# Patient Record
Sex: FEMALE | Race: WHITE
[De-identification: names, ages, dates, MRNs, and addresses within clinical notes are randomized per-mention and may not be internally consistent; named-entity substitution may affect disease eponyms.]

---
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
## 2023-08-11 ENCOUNTER — HOSPITAL ENCOUNTER (EMERGENCY)
Dept: HOSPITAL 74 - JER | Age: 23
Discharge: HOME | End: 2023-08-11
Payer: COMMERCIAL

## 2023-08-11 VITALS — BODY MASS INDEX: 21.4 KG/M2

## 2023-08-11 VITALS — HEART RATE: 81 BPM | SYSTOLIC BLOOD PRESSURE: 122 MMHG | DIASTOLIC BLOOD PRESSURE: 76 MMHG

## 2023-08-11 VITALS — RESPIRATION RATE: 18 BRPM

## 2023-08-11 DIAGNOSIS — R11.2: Primary | ICD-10-CM

## 2023-08-11 DIAGNOSIS — R10.84: ICD-10-CM

## 2023-08-11 DIAGNOSIS — F12.188: ICD-10-CM

## 2023-08-11 DIAGNOSIS — K29.60: ICD-10-CM

## 2023-08-11 DIAGNOSIS — R10.13: ICD-10-CM

## 2023-08-11 DIAGNOSIS — R55: ICD-10-CM

## 2023-08-11 LAB
ALBUMIN SERPL-MCNC: 5.1 G/DL (ref 3.4–5)
ALP SERPL-CCNC: 60 U/L (ref 45–117)
ALT SERPL-CCNC: 20 U/L (ref 13–61)
ANION GAP SERPL CALC-SCNC: 8 MMOL/L (ref 8–16)
APTT BLD: 30.1 SECONDS (ref 25.2–36.5)
AST SERPL-CCNC: 14 U/L (ref 15–37)
BASOPHILS # BLD: 0.6 % (ref 0–2)
BILIRUB SERPL-MCNC: 0.9 MG/DL (ref 0.2–1)
BUN SERPL-MCNC: 6 MG/DL (ref 7–18)
CALCIUM SERPL-MCNC: 9.3 MG/DL (ref 8.5–10.1)
CHLORIDE SERPL-SCNC: 106 MMOL/L (ref 98–107)
CO2 SERPL-SCNC: 27 MMOL/L (ref 21–32)
CREAT SERPL-MCNC: 0.7 MG/DL (ref 0.55–1.3)
DEPRECATED RDW RBC AUTO: 12.7 % (ref 11.6–15.6)
EOSINOPHIL # BLD: 1.2 % (ref 0–4.5)
GLUCOSE SERPL-MCNC: 90 MG/DL (ref 74–106)
HCT VFR BLD CALC: 40.2 % (ref 32.4–45.2)
HGB BLD-MCNC: 13.4 GM/DL (ref 10.7–15.3)
INR BLD: 1.13 (ref 0.83–1.09)
LIPASE SERPL-CCNC: 81 U/L (ref 73–393)
LYMPHOCYTES # BLD: 18.7 % (ref 8–40)
MAGNESIUM SERPL-MCNC: 2.3 MG/DL (ref 1.8–2.4)
MCH RBC QN AUTO: 31.3 PG (ref 25.7–33.7)
MCHC RBC AUTO-ENTMCNC: 33.4 G/DL (ref 32–36)
MCV RBC: 93.8 FL (ref 80–96)
MONOCYTES # BLD AUTO: 6 % (ref 3.8–10.2)
NEUTROPHILS # BLD: 73.5 % (ref 42.8–82.8)
PLATELET # BLD AUTO: 252 10^3/UL (ref 134–434)
PMV BLD: 9.8 FL (ref 7.5–11.1)
POTASSIUM SERPLBLD-SCNC: 3.4 MMOL/L (ref 3.5–5.1)
PROT SERPL-MCNC: 8.7 G/DL (ref 6.4–8.2)
PT PNL PPP: 13.1 SEC (ref 9.7–13)
RBC # BLD AUTO: 4.28 M/MM3 (ref 3.6–5.2)
SODIUM SERPL-SCNC: 140 MMOL/L (ref 136–145)
WBC # BLD AUTO: 8.5 K/MM3 (ref 4–10)

## 2023-08-11 PROCEDURE — 3E033GC INTRODUCTION OF OTHER THERAPEUTIC SUBSTANCE INTO PERIPHERAL VEIN, PERCUTANEOUS APPROACH: ICD-10-PCS

## 2023-08-11 PROCEDURE — 3E0333Z INTRODUCTION OF ANTI-INFLAMMATORY INTO PERIPHERAL VEIN, PERCUTANEOUS APPROACH: ICD-10-PCS

## 2024-02-23 PROBLEM — Z00.00 ENCOUNTER FOR PREVENTIVE HEALTH EXAMINATION: Status: ACTIVE | Noted: 2024-02-23
